# Patient Record
Sex: MALE | Race: WHITE | NOT HISPANIC OR LATINO | URBAN - METROPOLITAN AREA
[De-identification: names, ages, dates, MRNs, and addresses within clinical notes are randomized per-mention and may not be internally consistent; named-entity substitution may affect disease eponyms.]

---

## 2017-12-11 ENCOUNTER — EMERGENCY (EMERGENCY)
Facility: HOSPITAL | Age: 27
LOS: 1 days | End: 2017-12-11
Payer: MEDICAID

## 2017-12-11 PROCEDURE — 99283 EMERGENCY DEPT VISIT LOW MDM: CPT | Mod: 25

## 2017-12-11 PROCEDURE — 12001 RPR S/N/AX/GEN/TRNK 2.5CM/<: CPT

## 2020-12-20 ENCOUNTER — TRANSCRIPTION ENCOUNTER (OUTPATIENT)
Age: 30
End: 2020-12-20

## 2021-01-22 ENCOUNTER — TRANSCRIPTION ENCOUNTER (OUTPATIENT)
Age: 31
End: 2021-01-22

## 2022-02-07 PROBLEM — Z00.00 ENCOUNTER FOR PREVENTIVE HEALTH EXAMINATION: Status: ACTIVE | Noted: 2022-02-07

## 2022-02-17 ENCOUNTER — NON-APPOINTMENT (OUTPATIENT)
Age: 32
End: 2022-02-17

## 2022-02-17 ENCOUNTER — APPOINTMENT (OUTPATIENT)
Dept: ENDOCRINOLOGY | Facility: CLINIC | Age: 32
End: 2022-02-17
Payer: COMMERCIAL

## 2022-02-17 VITALS
DIASTOLIC BLOOD PRESSURE: 86 MMHG | RESPIRATION RATE: 16 BRPM | BODY MASS INDEX: 31.85 KG/M2 | HEIGHT: 72 IN | TEMPERATURE: 98.1 F | WEIGHT: 235.13 LBS | SYSTOLIC BLOOD PRESSURE: 130 MMHG | OXYGEN SATURATION: 100 % | HEART RATE: 100 BPM

## 2022-02-17 DIAGNOSIS — Z78.9 OTHER SPECIFIED HEALTH STATUS: ICD-10-CM

## 2022-02-17 DIAGNOSIS — Z82.61 FAMILY HISTORY OF ARTHRITIS: ICD-10-CM

## 2022-02-17 DIAGNOSIS — Z83.3 FAMILY HISTORY OF DIABETES MELLITUS: ICD-10-CM

## 2022-02-17 DIAGNOSIS — S83.207A UNSPECIFIED TEAR OF UNSPECIFIED MENISCUS, CURRENT INJURY, LEFT KNEE, INITIAL ENCOUNTER: ICD-10-CM

## 2022-02-17 DIAGNOSIS — Z83.49 FAMILY HISTORY OF OTHER ENDOCRINE, NUTRITIONAL AND METABOLIC DISEASES: ICD-10-CM

## 2022-02-17 LAB — GLUCOSE BLDC GLUCOMTR-MCNC: 210

## 2022-02-17 PROCEDURE — 99204 OFFICE O/P NEW MOD 45 MIN: CPT | Mod: 25

## 2022-02-17 PROCEDURE — 82962 GLUCOSE BLOOD TEST: CPT

## 2022-03-08 ENCOUNTER — APPOINTMENT (OUTPATIENT)
Dept: ENDOCRINOLOGY | Facility: CLINIC | Age: 32
End: 2022-03-08
Payer: COMMERCIAL

## 2022-03-08 PROCEDURE — G0108 DIAB MANAGE TRN  PER INDIV: CPT

## 2022-03-08 RX ORDER — BLOOD SUGAR DIAGNOSTIC
STRIP MISCELLANEOUS
Qty: 6 | Refills: 1 | Status: ACTIVE | COMMUNITY
Start: 2022-03-08 | End: 1900-01-01

## 2022-03-18 NOTE — REVIEW OF SYSTEMS
[Recent Weight Gain (___ Lbs)] : recent weight gain: [unfilled] lbs [Chest Pain] : no chest pain [Shortness Of Breath] : no shortness of breath [Nausea] : no nausea [Polyuria] : no polyuria [Muscle Cramps] : no muscle cramps [Ulcer] : no ulcer [Pain/Numbness of Digits] : no pain/numbness of digits [Depression] : no depression [Polydipsia] : no polydipsia [Anxiety] : no anxiety [FreeTextEntry3] : No DR

## 2022-03-18 NOTE — ASSESSMENT
[FreeTextEntry1] : 31 year old male with Type 1 DM with associated albuminuria.  He also has mild HTN.  His diabetes is uncontrolled.  \par \par 1.  T1DM-  I have advised a meeting with CDE to explore options for CGM.  Would recommend Dexcom G6.  Patient needs to check status of pump warranty as he would benefit from upgrade to a CSII with hybrid closed loop technology.   In the meantime, I increase overnight and evening basal rates and reduced late morning and early afternoon rates as per flowsheet.  Also increased ISF and ICR at lunch to prevent hypoglycemia.    Discussed using Temp basal rates while at work to reduce the risk of hypoglycemia.    Check labs now. \par 2.  HTN/ Albuminuria-  check UACR now.  Needs ACE-I or ARB.  Explained that it is unlikely that Losartan caused increased BG (more likely that this was the effect of the statin he was prescribed).  Based on labs, will give trial of lisinopril.  \par \par CGM medical necessity statement:\par Patient tests his blood glucose 4 or more times a day and injects insulin via insulin pump.\par He has been seen regularly at this office within the past 6 months.\par He requires frequent adjustments to his insulin regimen on the basis of CGM results using correction factor programmed into his insulin pump.  \par

## 2022-03-18 NOTE — HISTORY OF PRESENT ILLNESS
[FreeTextEntry1] : Here to establish care for DM.  Transitioning care from another endocrinologist. \par \par Quality:  Type 1 DM\par Severity:  moderate\par Duration of diabetes:  25 years.  \par Associated Complications/ Symptoms:  albuminuria\par Modifying Factors:  Better with insulin\par \par SMBG:  Testing 4 times a day\par Used CGM in the past (Guardian), but had issues with sensor adherence.   \par \par Current Diabetic Medication Regimen:\par Medtronic 670G insulin pump with Novolog \par \par Was prescribed losartan and atorvastatin in the past, but stopped this due to high BG.   \par Reviewed pump download and having frequent hyperglycemia overnight and in early morning and gets hypoglycemic in early afternoon while working.

## 2022-03-18 NOTE — PHYSICAL EXAM
[Healthy Appearance] : healthy appearance [No Acute Distress] : no acute distress [Normal Sclera/Conjunctiva] : normal sclera/conjunctiva [No Proptosis] : no proptosis [No Neck Mass] : no neck mass was observed [No LAD] : no lymphadenopathy [Supple] : the neck was supple [Thyroid Not Enlarged] : the thyroid was not enlarged [No Thyroid Nodules] : no palpable thyroid nodules [No Respiratory Distress] : no respiratory distress [Clear to Auscultation] : lungs were clear to auscultation bilaterally [Normal S1, S2] : normal S1 and S2 [No Murmurs] : no murmurs [Normal Rate] : heart rate was normal [Regular Rhythm] : with a regular rhythm [No Edema] : no peripheral edema [Normal Gait] : normal gait [No Joint Swelling] : no joint swelling seen [Right foot was examined, including] : right foot ~C was examined, including visual inspection with sensory and pulse exams [Left foot was examined, including] : left foot ~C was examined, including visual inspection with sensory and pulse exams [Normal] : normal [2+] : 2+ in the dorsalis pedis [No Tremors] : no tremors [Normal Sensation on Monofilament Testing] : normal sensation on monofilament testing of lower extremities [Normal Affect] : the affect was normal [Normal Insight/Judgement] : insight and judgment were intact [Normal Mood] : the mood was normal [Acanthosis Nigricans] : no acanthosis nigricans [Diminished Throughout Both Feet] : normal tactile sensation with monofilament testing throughout both feet [FreeTextEntry1] : Callus [FreeTextEntry5] : Callus

## 2022-04-07 RX ORDER — INSULIN ASPART 100 [IU]/ML
100 INJECTION, SOLUTION INTRAVENOUS; SUBCUTANEOUS
Qty: 8 | Refills: 1 | Status: DISCONTINUED | COMMUNITY
Start: 1900-01-01 | End: 2022-04-07

## 2022-06-07 ENCOUNTER — APPOINTMENT (OUTPATIENT)
Dept: ENDOCRINOLOGY | Facility: CLINIC | Age: 32
End: 2022-06-07
Payer: COMMERCIAL

## 2022-06-07 PROCEDURE — ZZZZZ: CPT

## 2022-06-07 PROCEDURE — 95249 CONT GLUC MNTR PT PROV EQP: CPT

## 2022-06-20 LAB
HBA1C MFR BLD HPLC: 7.9
LDLC SERPL DIRECT ASSAY-MCNC: 99
MICROALBUMIN/CREAT 24H UR-RTO: 1369

## 2022-06-21 ENCOUNTER — APPOINTMENT (OUTPATIENT)
Dept: ENDOCRINOLOGY | Facility: CLINIC | Age: 32
End: 2022-06-21
Payer: COMMERCIAL

## 2022-06-21 ENCOUNTER — RESULT CHARGE (OUTPATIENT)
Age: 32
End: 2022-06-21

## 2022-06-21 VITALS
WEIGHT: 222 LBS | HEIGHT: 72 IN | HEART RATE: 86 BPM | SYSTOLIC BLOOD PRESSURE: 135 MMHG | DIASTOLIC BLOOD PRESSURE: 80 MMHG | BODY MASS INDEX: 30.07 KG/M2 | OXYGEN SATURATION: 98 %

## 2022-06-21 LAB — GLUCOSE BLDC GLUCOMTR-MCNC: 134

## 2022-06-21 PROCEDURE — 99214 OFFICE O/P EST MOD 30 MIN: CPT | Mod: 25

## 2022-06-21 PROCEDURE — 95251 CONT GLUC MNTR ANALYSIS I&R: CPT

## 2022-06-21 NOTE — HISTORY OF PRESENT ILLNESS
[FreeTextEntry1] : Here to establish care for DM.  Transitioning care from another endocrinologist. \par \par Quality:  Type 1 DM\par Severity:  moderate\par Duration of diabetes:  25 years.  \par Associated Complications/ Symptoms:  albuminuria\par Modifying Factors:  Better with insulin\par \par SMBG:  Prior to CGM, was testing 4 times a day\par Currently using Dexcom G6.   Average BG is 176 mg/dl with SD of 76.  56% of BG within target range, 2% below target and 42% above target.  having hyperglycemia in the evening and overnight time periods. \par  \par Current Diabetic Medication Regimen:\par Medtronic 670G insulin pump with Novolog-  pump warranty does not  until 2023.  \par \par Claims that Losartan and atorvastatin caused hyperglycemia in the past.

## 2022-06-21 NOTE — ASSESSMENT
[FreeTextEntry1] : 31 year old male with Type 1 DM with associated albuminuria.  He also has mild HTN.  Recent labs show worsening substantial albuminuria.   His glycemic control is improving, but is still above goal.  \par \par 1.  T1DM-  Increase basal at 12AM to 2.1 and at 6PM to 2.  Also decrease ICR to 7 at 6PM to prevent hypoglycemia. \par 2.  HTN/ Albuminuria-  Start Lisinopril 5 mg daily.  Will repeat BMP in 2-3 weeks to ensure K level remains normal.  If UACR does not improve, will refer to nephrology.   \par \par CGM medical necessity statement:\par Patient tests his blood glucose 4 or more times a day and injects insulin via insulin pump.\par He has been seen regularly at this office within the past 6 months.\par He requires frequent adjustments to his insulin regimen on the basis of CGM results using correction factor programmed into his insulin pump.  \par \par \par Follow up in 4 months.

## 2022-06-21 NOTE — REVIEW OF SYSTEMS
[Fatigue] : fatigue [Palpitations] : no palpitations [Shortness Of Breath] : no shortness of breath [Nausea] : no nausea

## 2022-06-30 ENCOUNTER — APPOINTMENT (OUTPATIENT)
Dept: ENDOCRINOLOGY | Facility: CLINIC | Age: 32
End: 2022-06-30

## 2022-06-30 RX ORDER — INFUSION SET FOR INSULIN PUMP
INFUSION SETS-PARAPHERNALIA MISCELLANEOUS
Qty: 2 | Refills: 3 | Status: DISCONTINUED | COMMUNITY
Start: 2022-06-23 | End: 2022-06-30

## 2022-06-30 RX ORDER — INSULIN PUMP SYRINGE, 1.8 ML
EACH MISCELLANEOUS
Refills: 0 | Status: DISCONTINUED | COMMUNITY
End: 2022-06-30

## 2022-06-30 RX ORDER — INSULIN PUMP SYRINGE, 3 ML
EACH MISCELLANEOUS
Qty: 30 | Refills: 3 | Status: DISCONTINUED | COMMUNITY
Start: 2022-06-23 | End: 2022-06-30

## 2022-11-08 ENCOUNTER — APPOINTMENT (OUTPATIENT)
Dept: ENDOCRINOLOGY | Facility: CLINIC | Age: 32
End: 2022-11-08

## 2022-11-08 VITALS
DIASTOLIC BLOOD PRESSURE: 90 MMHG | HEIGHT: 72 IN | TEMPERATURE: 98.1 F | BODY MASS INDEX: 31.97 KG/M2 | HEART RATE: 90 BPM | RESPIRATION RATE: 16 BRPM | OXYGEN SATURATION: 98 % | WEIGHT: 236.06 LBS | SYSTOLIC BLOOD PRESSURE: 150 MMHG

## 2022-11-08 LAB — GLUCOSE BLDC GLUCOMTR-MCNC: 166

## 2022-11-08 PROCEDURE — 95251 CONT GLUC MNTR ANALYSIS I&R: CPT

## 2022-11-08 PROCEDURE — 99214 OFFICE O/P EST MOD 30 MIN: CPT | Mod: 25

## 2022-11-08 PROCEDURE — 82962 GLUCOSE BLOOD TEST: CPT

## 2022-11-08 RX ORDER — LISINOPRIL 5 MG/1
5 TABLET ORAL DAILY
Qty: 90 | Refills: 1 | Status: DISCONTINUED | COMMUNITY
Start: 2022-06-21 | End: 2022-11-08

## 2022-11-08 NOTE — DATA REVIEWED
[FreeTextEntry1] : LABS:\par 10/26/2022:\par UACR 786\par K 4.5\par A1c 6.4%\par \par 10/11/2021:\par UACR  508\par A1c 8.5%\par LDL 59\par TSH 2.52

## 2022-11-08 NOTE — ASSESSMENT
[FreeTextEntry1] : 32 year old male with Type 1 DM with associated albuminuria.  He also has mild HTN.  His diabetes control is improving based on recent A1c, although control is not yet optimal based on CGM review.   \par \par 1.  T1DM-   INcrease 12PM and 6pm basal rates and decreased ICR as per flowsheet to reduce hyperglycemia.\par 2.  HTN/ Albuminuria-  Improving with Lisinopril, but BP is still suboptimal, so will increase lisinopril to 10 mg daily.    \par \par CGM medical necessity statement:\par Patient tests his blood glucose 4 or more times a day and injects insulin via insulin pump.\par He has been seen regularly at this office within the past 6 months.\par He requires frequent adjustments to his insulin regimen on the basis of CGM results using correction factor programmed into his insulin pump.  \par \par Follow up in 4 months.

## 2022-11-08 NOTE — HISTORY OF PRESENT ILLNESS
[FreeTextEntry1] : Follow up Type 1 DM\par \par Quality:  Type 1 DM\par Severity:  moderate\par Duration of diabetes:  25 years.  \par Associated Complications/ Symptoms:  albuminuria\par Modifying Factors:  Better with insulin\par \par SMBG:  Prior to CGM, was testing 4 times a day\par Currently using Dexcom G6.   Average BG is 216 mg/dl with SD of 91.  41% of BG within target range, 1% below target and 58% above target.   Having evening and overnight hyperglycemia\par  \par Current Diabetic Medication Regimen:\par Medtronic 670G insulin pump with Novolog (warranty does not  until 2023).  \par \par Claims that Losartan and atorvastatin caused hyperglycemia in the past.  \par Started on lisinopril last visit.

## 2022-12-30 RX ORDER — INSULIN GLARGINE 100 [IU]/ML
100 INJECTION, SOLUTION SUBCUTANEOUS
Qty: 1 | Refills: 1 | Status: ACTIVE | COMMUNITY
Start: 2022-12-22 | End: 1900-01-01

## 2023-03-06 LAB
HBA1C MFR BLD HPLC: 6.8
LDLC SERPL CALC-MCNC: 107
MICROALBUMIN/CREAT 24H UR-RTO: 713

## 2023-03-07 ENCOUNTER — NON-APPOINTMENT (OUTPATIENT)
Age: 33
End: 2023-03-07

## 2023-03-07 ENCOUNTER — APPOINTMENT (OUTPATIENT)
Dept: ENDOCRINOLOGY | Facility: CLINIC | Age: 33
End: 2023-03-07
Payer: COMMERCIAL

## 2023-03-07 ENCOUNTER — RESULT CHARGE (OUTPATIENT)
Age: 33
End: 2023-03-07

## 2023-03-07 VITALS
BODY MASS INDEX: 31.15 KG/M2 | SYSTOLIC BLOOD PRESSURE: 126 MMHG | HEART RATE: 90 BPM | DIASTOLIC BLOOD PRESSURE: 84 MMHG | HEIGHT: 72 IN | WEIGHT: 230 LBS | OXYGEN SATURATION: 98 %

## 2023-03-07 LAB — GLUCOSE BLDC GLUCOMTR-MCNC: 98

## 2023-03-07 PROCEDURE — 82962 GLUCOSE BLOOD TEST: CPT

## 2023-03-07 PROCEDURE — 99214 OFFICE O/P EST MOD 30 MIN: CPT | Mod: 25

## 2023-03-07 PROCEDURE — 95251 CONT GLUC MNTR ANALYSIS I&R: CPT

## 2023-03-07 NOTE — ASSESSMENT
[FreeTextEntry1] : 33 year old male with Type 1 DM with associated albuminuria and HTN here for follow up.  His diabetes control is improving on insulin pump therapy.   \par \par 1.  T1DM-   Upgrade to insulin pump with hybrid closed loop technology to improve control.   Decrease ICR ratio by 1 at 12PM and 6PM to reduce PP hyperglycemia.  \par 2.  HTN/ Albuminuria-   Continue lisinopril 10 mg daily.    \par \par CGM medical necessity statement:\par Patient tests his blood glucose 4 or more times a day and injects insulin via insulin pump.\par He has been seen regularly at this office within the past 6 months.\par He requires frequent adjustments to his insulin regimen on the basis of CGM results using correction factor programmed into his insulin pump.  \par \par Follow up in 4 months.

## 2023-03-07 NOTE — HISTORY OF PRESENT ILLNESS
[FreeTextEntry1] : Follow up Type 1 DM\par \par Quality:  Type 1 DM\par Severity:  moderate\par Duration of diabetes:  25 years.  \par Associated Complications/ Symptoms:  albuminuria\par Modifying Factors:  Better with insulin\par \par SMBG:  Prior to CGM, was testing 4 times a day\par Currently using Dexcom G6.   Average BG is 179 mg/dl with SD of 78.  55% of BG within target range, 3% below target and 42% above target.   Having evening and overnight hyperglycemia\par  \par Current Diabetic Medication Regimen:\par Medtronic 670G insulin pump with Novolog (warranty  2023).  \par \par Claims that Losartan and atorvastatin caused hyperglycemia in the past.  \par Started on lisinopril in .

## 2023-04-11 ENCOUNTER — APPOINTMENT (OUTPATIENT)
Dept: ENDOCRINOLOGY | Facility: CLINIC | Age: 33
End: 2023-04-11
Payer: COMMERCIAL

## 2023-04-11 PROCEDURE — P0006: CPT

## 2023-04-21 RX ORDER — BLOOD-GLUCOSE SENSOR
EACH MISCELLANEOUS
Qty: 3 | Refills: 1 | Status: DISCONTINUED | COMMUNITY
Start: 2022-11-10 | End: 2023-04-21

## 2023-04-21 RX ORDER — BLOOD-GLUCOSE,RECEIVER,CONT
EACH MISCELLANEOUS
Qty: 1 | Refills: 0 | Status: DISCONTINUED | COMMUNITY
Start: 2022-03-08 | End: 2023-04-21

## 2023-05-15 ENCOUNTER — RX RENEWAL (OUTPATIENT)
Age: 33
End: 2023-05-15

## 2023-07-31 ENCOUNTER — APPOINTMENT (OUTPATIENT)
Dept: ENDOCRINOLOGY | Facility: CLINIC | Age: 33
End: 2023-07-31
Payer: COMMERCIAL

## 2023-07-31 PROCEDURE — 95251 CONT GLUC MNTR ANALYSIS I&R: CPT

## 2023-07-31 PROCEDURE — 99214 OFFICE O/P EST MOD 30 MIN: CPT | Mod: 25

## 2023-07-31 RX ORDER — INSULIN PUMP SYRINGE, 3 ML
EACH MISCELLANEOUS
Qty: 30 | Refills: 3 | Status: DISCONTINUED | COMMUNITY
Start: 2022-06-30 | End: 2023-07-31

## 2023-07-31 RX ORDER — INFUSION SET FOR INSULIN PUMP
INFUSION SETS-PARAPHERNALIA MISCELLANEOUS
Qty: 2 | Refills: 3 | Status: DISCONTINUED | COMMUNITY
Start: 2022-12-06 | End: 2023-07-31

## 2023-07-31 RX ORDER — INSULIN PUMP SYRINGE, 3 ML
EACH MISCELLANEOUS
Qty: 2 | Refills: 3 | Status: DISCONTINUED | COMMUNITY
Start: 2022-12-06 | End: 2023-07-31

## 2023-07-31 RX ORDER — INFUSION SET FOR INSULIN PUMP
INFUSION SETS-PARAPHERNALIA MISCELLANEOUS
Qty: 2 | Refills: 3 | Status: DISCONTINUED | COMMUNITY
Start: 2022-06-30 | End: 2023-07-31

## 2023-07-31 NOTE — HISTORY OF PRESENT ILLNESS
[FreeTextEntry1] : Follow up Type 1 DM  Quality:  Type 1 DM Severity:  moderate Duration of diabetes:  25 years.   Associated Complications/ Symptoms:  albuminuria Modifying Factors:  Better with insulin  SMBG:  Prior to CGM, was testing 4 times a day Currently using Dexcom G6.   Average BG is 150 mg/dl with 71% of BG within target range, 1% below target and 22% above target.  Reports improved BG control on Tslim pump.  Very little hypoglycemia now.       Current Diabetic Medication Regimen: Tslim XR with control IQ (transitioned from [employer] in 4/2023).  Claims that Losartan and atorvastatin caused hyperglycemia in the past.   Started on lisinopril in 2022.

## 2023-07-31 NOTE — DATA REVIEWED
[FreeTextEntry1] : LABS: 7/21/2023: UACR 273 LDL 88 A1c 6.7%  10/26/2022: UACR 786 K 4.5 A1c 6.4%  10/11/2021: UACR  508 A1c 8.5% LDL 59 TSH 2.52

## 2023-07-31 NOTE — ASSESSMENT
[FreeTextEntry1] : 33 year old male with Type 1 DM with associated albuminuria and HTN here for follow up.  His diabetes is well controlled.    1.  T1DM-   Continue current insulin pump settings.   Will repeat labs prior to next visit.  2.  HTN/ Albuminuria-   Continue lisinopril 10 mg daily.      CGM medical necessity statement: Patient tests his blood glucose 4 or more times a day and injects insulin via insulin pump. He has been seen regularly at this office within the past 6 months. He requires frequent adjustments to his insulin regimen on the basis of CGM results using correction factor programmed into his insulin pump.    Follow up in 4 months.

## 2023-10-09 ENCOUNTER — RX RENEWAL (OUTPATIENT)
Age: 33
End: 2023-10-09

## 2023-11-28 ENCOUNTER — APPOINTMENT (OUTPATIENT)
Dept: ENDOCRINOLOGY | Facility: CLINIC | Age: 33
End: 2023-11-28
Payer: COMMERCIAL

## 2023-11-28 VITALS
HEART RATE: 90 BPM | DIASTOLIC BLOOD PRESSURE: 80 MMHG | HEIGHT: 71.75 IN | TEMPERATURE: 98 F | SYSTOLIC BLOOD PRESSURE: 120 MMHG | OXYGEN SATURATION: 99 % | RESPIRATION RATE: 16 BRPM | BODY MASS INDEX: 33.21 KG/M2 | WEIGHT: 242.56 LBS

## 2023-11-28 PROCEDURE — 99214 OFFICE O/P EST MOD 30 MIN: CPT | Mod: 25

## 2023-11-28 PROCEDURE — 95251 CONT GLUC MNTR ANALYSIS I&R: CPT

## 2023-11-28 RX ORDER — LISINOPRIL 10 MG/1
10 TABLET ORAL
Qty: 90 | Refills: 1 | Status: ACTIVE | COMMUNITY
Start: 2022-11-08 | End: 1900-01-01

## 2023-11-28 RX ORDER — EPINEPHRINE 0.3 MG/.3ML
0.3 INJECTION INTRAMUSCULAR
Qty: 2 | Refills: 0 | Status: DISCONTINUED | COMMUNITY
Start: 2022-03-16 | End: 2023-11-28

## 2023-11-28 RX ORDER — OMALIZUMAB 150 MG/ML
150 INJECTION, SOLUTION SUBCUTANEOUS
Qty: 2 | Refills: 0 | Status: DISCONTINUED | COMMUNITY
Start: 2022-05-26 | End: 2023-11-28

## 2024-01-09 RX ORDER — INSULIN ASPART 100 [IU]/ML
100 INJECTION, SOLUTION INTRAVENOUS; SUBCUTANEOUS
Qty: 8 | Refills: 1 | Status: ACTIVE | COMMUNITY
Start: 2022-04-07 | End: 1900-01-01

## 2024-04-02 ENCOUNTER — APPOINTMENT (OUTPATIENT)
Dept: ENDOCRINOLOGY | Facility: CLINIC | Age: 34
End: 2024-04-02
Payer: COMMERCIAL

## 2024-04-02 VITALS
SYSTOLIC BLOOD PRESSURE: 112 MMHG | OXYGEN SATURATION: 98 % | WEIGHT: 239 LBS | BODY MASS INDEX: 33.46 KG/M2 | HEART RATE: 83 BPM | RESPIRATION RATE: 16 BRPM | DIASTOLIC BLOOD PRESSURE: 70 MMHG | HEIGHT: 71 IN

## 2024-04-02 DIAGNOSIS — E10.65 TYPE 1 DIABETES MELLITUS WITH HYPERGLYCEMIA: ICD-10-CM

## 2024-04-02 DIAGNOSIS — I10 ESSENTIAL (PRIMARY) HYPERTENSION: ICD-10-CM

## 2024-04-02 LAB
GLUCOSE BLDC GLUCOMTR-MCNC: 133
HBA1C MFR BLD HPLC: 6
MICROALBUMIN/CREAT 24H UR-RTO: 365

## 2024-04-02 PROCEDURE — 95251 CONT GLUC MNTR ANALYSIS I&R: CPT

## 2024-04-02 PROCEDURE — 82962 GLUCOSE BLOOD TEST: CPT

## 2024-04-02 PROCEDURE — 99214 OFFICE O/P EST MOD 30 MIN: CPT

## 2024-04-02 NOTE — HISTORY OF PRESENT ILLNESS
[FreeTextEntry1] : Follow up Type 1 DM  Quality:  Type 1 DM Severity:  moderate Duration of diabetes:  25 years.   Associated Complications/ Symptoms:  albuminuria Modifying Factors:  Better with insulin  SMBG:  Prior to CGM, was testing 4 times a day Currently using Dexcom G6.   Average BG is 147 mg/dl with CV of 36%.  72% of BG within target range, < 2% below target and 27% above target.       Current Diabetic Medication Regimen: Tslim X2 insulin pump with control IQ (transitioned from [employer] in 4/2023).  Claims that Losartan and atorvastatin caused hyperglycemia in the past.   Started on lisinopril in 2022.

## 2024-04-02 NOTE — REVIEW OF SYSTEMS
[Recent Weight Loss (___ Lbs)] : recent weight loss: [unfilled] lbs [Pain/Numbness of Digits] : no pain/numbness of digits

## 2024-04-02 NOTE — PHYSICAL EXAM
[Healthy Appearance] : healthy appearance [No Acute Distress] : no acute distress [Normal Sclera/Conjunctiva] : normal sclera/conjunctiva [No Proptosis] : no proptosis [No Neck Mass] : no neck mass was observed [No LAD] : no lymphadenopathy [Supple] : the neck was supple [Thyroid Not Enlarged] : the thyroid was not enlarged [No Thyroid Nodules] : no palpable thyroid nodules [No Respiratory Distress] : no respiratory distress [Clear to Auscultation] : lungs were clear to auscultation bilaterally [Normal S1, S2] : normal S1 and S2 [Normal Rate] : heart rate was normal [No Murmurs] : no murmurs [Regular Rhythm] : with a regular rhythm [Acanthosis Nigricans] : no acanthosis nigricans [Normal Affect] : the affect was normal [Normal Insight/Judgement] : insight and judgment were intact [Normal Mood] : the mood was normal

## 2024-04-02 NOTE — DATA REVIEWED
[FreeTextEntry1] : LABS: 3/25/2024: UACR 365 A1c 6%  11/20/2023: A1c 6.5% UACR  173 LDL 96  7/21/2023: UACR 273 LDL 88 A1c 6.7%  10/26/2022: UACR 786 K 4.5 A1c 6.4%  10/11/2021: UACR  508 A1c 8.5% LDL 59 TSH 2.52

## 2024-04-02 NOTE — ASSESSMENT
[FreeTextEntry1] : 34 year old male with Type 1 DM with associated albuminuria and HTN here for follow up. His diabetes is well controlled.  1. T1DM-  Continue current insulin pump settings.  Repeat labs in 4 months.    2. HTN/ Albuminuria- Continue lisinopril 10 mg daily.  UACR is improving significantly on ACE-I.    CGM medical necessity statement: Patient tests his blood glucose 4 or more times a day and injects insulin via insulin pump. He has been seen regularly at this office within the past 6 months. He requires frequent adjustments to his insulin regimen on the basis of CGM results using correction factor programmed into his insulin pump.  Follow up in 4 months.

## 2024-06-12 RX ORDER — BLOOD-GLUCOSE TRANSMITTER
EACH MISCELLANEOUS
Qty: 1 | Refills: 1 | Status: ACTIVE | COMMUNITY
Start: 2023-04-21 | End: 1900-01-01

## 2024-06-12 RX ORDER — BLOOD-GLUCOSE SENSOR
EACH MISCELLANEOUS
Qty: 3 | Refills: 1 | Status: ACTIVE | COMMUNITY
Start: 2022-03-08 | End: 1900-01-01

## 2024-06-12 RX ORDER — BLOOD-GLUCOSE SENSOR
EACH MISCELLANEOUS
Qty: 3 | Refills: 3 | Status: DISCONTINUED | COMMUNITY
Start: 2023-04-07 | End: 2024-06-12

## 2024-06-12 RX ORDER — BLOOD-GLUCOSE TRANSMITTER
EACH MISCELLANEOUS
Qty: 1 | Refills: 1 | Status: DISCONTINUED | COMMUNITY
Start: 2022-11-10 | End: 2024-06-12

## 2024-06-12 RX ORDER — BLOOD-GLUCOSE TRANSMITTER
EACH MISCELLANEOUS
Qty: 1 | Refills: 1 | Status: DISCONTINUED | COMMUNITY
Start: 2022-03-08 | End: 2024-06-12

## 2024-09-03 ENCOUNTER — NON-APPOINTMENT (OUTPATIENT)
Age: 34
End: 2024-09-03

## 2024-09-04 ENCOUNTER — APPOINTMENT (OUTPATIENT)
Dept: ENDOCRINOLOGY | Facility: CLINIC | Age: 34
End: 2024-09-04
Payer: COMMERCIAL

## 2024-09-04 VITALS
DIASTOLIC BLOOD PRESSURE: 82 MMHG | BODY MASS INDEX: 32.85 KG/M2 | OXYGEN SATURATION: 98 % | HEIGHT: 72 IN | TEMPERATURE: 98 F | SYSTOLIC BLOOD PRESSURE: 130 MMHG | RESPIRATION RATE: 16 BRPM | HEART RATE: 80 BPM | WEIGHT: 242.56 LBS

## 2024-09-04 DIAGNOSIS — E10.65 TYPE 1 DIABETES MELLITUS WITH HYPERGLYCEMIA: ICD-10-CM

## 2024-09-04 DIAGNOSIS — I10 ESSENTIAL (PRIMARY) HYPERTENSION: ICD-10-CM

## 2024-09-04 PROCEDURE — 99214 OFFICE O/P EST MOD 30 MIN: CPT

## 2024-09-04 PROCEDURE — 95251 CONT GLUC MNTR ANALYSIS I&R: CPT

## 2024-09-04 RX ORDER — BLOOD-GLUCOSE SENSOR
EACH MISCELLANEOUS
Qty: 9 | Refills: 1 | Status: ACTIVE | COMMUNITY
Start: 2024-09-04 | End: 1900-01-01

## 2024-09-04 NOTE — ASSESSMENT
[FreeTextEntry1] : 34 year old male with Type 1 DM with associated albuminuria, HTN and mild HLD here for follow up. His diabetes is well controlled.  1. T1DM- Continue current insulin pump settings.  Enter all CHO into bolus calculator for more accurate bolus amounts.  Recommended routine dilated eye exam.   Change to Dexcom G7.   2. HTN/ Albuminuria- Continue lisinopril 10 mg daily.   3.  HLD-  mild, but has bene intolerant to statin in the past.  Will repeat fasting lipids with next labs.  If LDL remains above goal, consider trial of alternative statin.    Follow up in 4 months.  CGM STATEMENT: This patient with diabetes - Is currently using CGM and is receiving insulin using an insulin pump - Is adjusting insulin dose using a correction factor programmed into the pump, as documented in the medical record - Has been seen in the office by a provider within the last six months to review CGM data with their provider - Has completed a diabetes education program [employer], Dexcom and Guardian 4 CGM require one test strip daily to use in case of sensor failure or for blood glucose verification or during sensor warmup. Guardian 3 CGM requires 6 test strips daily for calibration with automated pump and blood glucose correction. CGM is medically necessary as it can integrate with their insulin pump to allow for closed loop therapy.

## 2024-09-04 NOTE — REVIEW OF SYSTEMS
[Recent Weight Gain (___ Lbs)] : no recent weight gain [Recent Weight Loss (___ Lbs)] : no recent weight loss [Chest Pain] : no chest pain [Palpitations] : no palpitations [Shortness Of Breath] : no shortness of breath

## 2024-09-04 NOTE — HISTORY OF PRESENT ILLNESS
[FreeTextEntry1] : Follow up Type 1 DM  Quality:  Type 1 DM Severity:  moderate Duration of diabetes:  25 years.   Associated Complications/ Symptoms:  albuminuria Modifying Factors:  Better with insulin  SMBG:  Prior to CGM, was testing 4 times a day Currently using Dexcom G6.   Average BG is 164 mg/dl with CV of 35%.  66% of BG within target range, <1% below target and 33% above target.    Has not been entering carbs for food boluses (doing manual bolus entry)    Current Diabetic Medication Regimen: Tslim X2 insulin pump with control IQ (transitioned from [employer] in 4/2023).  Claims that Losartan and atorvastatin caused hyperglycemia in the past.   Started on lisinopril in 2022 for albuminuria.

## 2024-09-04 NOTE — DATA REVIEWED
[FreeTextEntry1] : LABS: 8/26/2024: UACR 285  A1c 6.4 TSH 1.67  3/25/2024: UACR 365 A1c 6%  11/20/2023: A1c 6.5% UACR  173 LDL 96  7/21/2023: UACR 273 LDL 88 A1c 6.7%  10/26/2022: UACR 786 K 4.5 A1c 6.4%  10/11/2021: UACR  508 A1c 8.5% LDL 59 TSH 2.52

## 2025-01-09 ENCOUNTER — RX RENEWAL (OUTPATIENT)
Age: 35
End: 2025-01-09

## 2025-01-22 LAB
HBA1C MFR BLD HPLC: 6.4
LDLC SERPL DIRECT ASSAY-MCNC: 108
MICROALBUMIN/CREAT 24H UR-RTO: 285
TSH SERPL-ACNC: 1.67

## 2025-01-23 ENCOUNTER — APPOINTMENT (OUTPATIENT)
Dept: ENDOCRINOLOGY | Facility: CLINIC | Age: 35
End: 2025-01-23
Payer: COMMERCIAL

## 2025-01-23 VITALS
HEIGHT: 72 IN | BODY MASS INDEX: 33.05 KG/M2 | RESPIRATION RATE: 16 BRPM | WEIGHT: 244 LBS | OXYGEN SATURATION: 98 % | DIASTOLIC BLOOD PRESSURE: 90 MMHG | SYSTOLIC BLOOD PRESSURE: 136 MMHG | HEART RATE: 86 BPM

## 2025-01-23 DIAGNOSIS — E10.65 TYPE 1 DIABETES MELLITUS WITH HYPERGLYCEMIA: ICD-10-CM

## 2025-01-23 DIAGNOSIS — I10 ESSENTIAL (PRIMARY) HYPERTENSION: ICD-10-CM

## 2025-01-23 LAB — GLUCOSE BLDC GLUCOMTR-MCNC: 121

## 2025-01-23 PROCEDURE — 99214 OFFICE O/P EST MOD 30 MIN: CPT

## 2025-01-23 PROCEDURE — 95251 CONT GLUC MNTR ANALYSIS I&R: CPT

## 2025-01-23 PROCEDURE — 82962 GLUCOSE BLOOD TEST: CPT

## 2025-01-23 RX ORDER — GLUCAGON INJECTION, SOLUTION 1 MG/.2ML
1 INJECTION, SOLUTION SUBCUTANEOUS
Qty: 1 | Refills: 1 | Status: ACTIVE | COMMUNITY
Start: 2025-01-23 | End: 1900-01-01

## 2025-03-03 ENCOUNTER — RX RENEWAL (OUTPATIENT)
Age: 35
End: 2025-03-03

## 2025-03-07 RX ORDER — INSULIN GLARGINE-YFGN 100 [IU]/ML
100 INJECTION, SOLUTION SUBCUTANEOUS
Qty: 45 | Refills: 1 | Status: ACTIVE | COMMUNITY
Start: 2025-03-07 | End: 1900-01-01

## 2025-06-04 LAB
HBA1C MFR BLD HPLC: 5.7
LDLC SERPL DIRECT ASSAY-MCNC: 89
MICROALBUMIN/CREAT 24H UR-RTO: 401
TSH SERPL-ACNC: 1.47

## 2025-06-05 ENCOUNTER — APPOINTMENT (OUTPATIENT)
Dept: ENDOCRINOLOGY | Facility: CLINIC | Age: 35
End: 2025-06-05
Payer: COMMERCIAL

## 2025-06-05 VITALS
TEMPERATURE: 98 F | WEIGHT: 234 LBS | OXYGEN SATURATION: 98 % | SYSTOLIC BLOOD PRESSURE: 120 MMHG | HEIGHT: 72 IN | HEART RATE: 80 BPM | BODY MASS INDEX: 31.69 KG/M2 | DIASTOLIC BLOOD PRESSURE: 80 MMHG | RESPIRATION RATE: 16 BRPM

## 2025-06-05 DIAGNOSIS — I10 ESSENTIAL (PRIMARY) HYPERTENSION: ICD-10-CM

## 2025-06-05 DIAGNOSIS — E10.65 TYPE 1 DIABETES MELLITUS WITH HYPERGLYCEMIA: ICD-10-CM

## 2025-06-05 PROCEDURE — 99214 OFFICE O/P EST MOD 30 MIN: CPT

## 2025-06-05 PROCEDURE — 95251 CONT GLUC MNTR ANALYSIS I&R: CPT

## 2025-09-04 ENCOUNTER — NON-APPOINTMENT (OUTPATIENT)
Age: 35
End: 2025-09-04